# Patient Record
Sex: MALE | Race: WHITE | NOT HISPANIC OR LATINO | URBAN - METROPOLITAN AREA
[De-identification: names, ages, dates, MRNs, and addresses within clinical notes are randomized per-mention and may not be internally consistent; named-entity substitution may affect disease eponyms.]

---

## 2023-01-11 ENCOUNTER — APPOINTMENT (OUTPATIENT)
Dept: URBAN - METROPOLITAN AREA SURGERY 17 | Age: 63
Setting detail: DERMATOLOGY
End: 2023-01-12

## 2023-01-11 VITALS
RESPIRATION RATE: 14 BRPM | HEIGHT: 70 IN | SYSTOLIC BLOOD PRESSURE: 138 MMHG | WEIGHT: 190 LBS | DIASTOLIC BLOOD PRESSURE: 86 MMHG | HEART RATE: 60 BPM | TEMPERATURE: 97.7 F

## 2023-01-11 DIAGNOSIS — L57.0 ACTINIC KERATOSIS: ICD-10-CM

## 2023-01-11 PROCEDURE — OTHER MIPS QUALITY: OTHER

## 2023-01-11 PROCEDURE — 17000 DESTRUCT PREMALG LESION: CPT

## 2023-01-11 PROCEDURE — OTHER BENIGN DESTRUCTION: CO2 LASER: OTHER

## 2023-01-11 ASSESSMENT — LOCATION DETAILED DESCRIPTION DERM: LOCATION DETAILED: LEFT SUPERIOR HELIX

## 2023-01-11 ASSESSMENT — LOCATION ZONE DERM: LOCATION ZONE: EAR

## 2023-01-11 ASSESSMENT — LOCATION SIMPLE DESCRIPTION DERM: LOCATION SIMPLE: LEFT EAR

## 2023-01-11 NOTE — PROCEDURE: BENIGN DESTRUCTION: CO2 LASER
Medical Necessity Information: It is in your best interest to select a reason for this procedure from the list below. All of these items fulfill various CMS LCD requirements except the new and changing color options.
Was Feathering Performed?: Yes
Feathering Statement: Following the treatment the wound edges were feathered using 260mJ.
Medical Necessity Clause: This procedure was medically necessary because the lesions that were treated were:
Add 52 Modifier (Optional): no
Wavelength: 10,600nm
Power (Green-Leave At Zero If Unwanted): 35
Spot Sizes: 3mm
Consent: Written consent obtained, risks reviewed including but not limited to crusting, scabbing, blistering, scarring, darker or lighter pigmentary change, incomplete improvement, infection and bleeding.
Passes: 2
Pulse Duration (Usec): 0
External Cooling Fan Speed: 5
Detail Level: Detailed
Post-Care Statement: Following the procedure, vaseline and a bandage were applied.
Laser Mode: Fractionated
Laser Type: [place] 50 CO2 Laser
Treatment Number: 1
Post-Care Instructions: I reviewed with the patient in detail post-care instructions. Patient should avoid sun until the area is fully healed. Pt should apply vaseline to treated areas.
Energy(Mj-Leave At Zero If Unwanted): 500
Anesthesia Type: 1% lidocaine with epinephrine